# Patient Record
Sex: FEMALE | URBAN - METROPOLITAN AREA
[De-identification: names, ages, dates, MRNs, and addresses within clinical notes are randomized per-mention and may not be internally consistent; named-entity substitution may affect disease eponyms.]

---

## 2022-02-24 ENCOUNTER — WEB ENCOUNTER (OUTPATIENT)
Dept: URBAN - METROPOLITAN AREA CLINIC 113 | Facility: CLINIC | Age: 32
End: 2022-02-24

## 2022-04-13 ENCOUNTER — OFFICE VISIT (OUTPATIENT)
Dept: URBAN - METROPOLITAN AREA CLINIC 107 | Facility: CLINIC | Age: 32
End: 2022-04-13
Payer: COMMERCIAL

## 2022-04-13 VITALS
HEART RATE: 94 BPM | TEMPERATURE: 98 F | WEIGHT: 161 LBS | DIASTOLIC BLOOD PRESSURE: 95 MMHG | HEIGHT: 62 IN | BODY MASS INDEX: 29.63 KG/M2 | SYSTOLIC BLOOD PRESSURE: 120 MMHG | RESPIRATION RATE: 18 BRPM

## 2022-04-13 DIAGNOSIS — R10.84 GENERALIZED ABDOMINAL PAIN: ICD-10-CM

## 2022-04-13 DIAGNOSIS — R14.0 BLOATING: ICD-10-CM

## 2022-04-13 DIAGNOSIS — R19.7 DIARRHEA, UNSPECIFIED TYPE: ICD-10-CM

## 2022-04-13 PROCEDURE — 99204 OFFICE O/P NEW MOD 45 MIN: CPT | Performed by: INTERNAL MEDICINE

## 2022-04-13 PROCEDURE — 99244 OFF/OP CNSLTJ NEW/EST MOD 40: CPT | Performed by: INTERNAL MEDICINE

## 2022-04-13 RX ORDER — BUSPIRONE HYDROCHLORIDE 15 MG/1
1 TABLET TABLET ORAL TWICE A DAY
Status: ACTIVE | COMMUNITY

## 2022-04-13 RX ORDER — PANTOPRAZOLE SODIUM 40 MG/1
1 TABLET TABLET, DELAYED RELEASE ORAL BID
Qty: 180 TABLET | Refills: 3 | OUTPATIENT
Start: 2022-04-13

## 2022-04-13 NOTE — HPI-TODAY'S VISIT:
The patient was referred by Dr. Dayron Canas for abdominal pain/bloating/diarrhea.   A copy of this document is being forwarded to the referring provider.  31-year-old female presenting with a primary complaint of abdominal pain, bloating, gas, and diarrhea.  She has had labs performed in March 2021 by her primary care physician.  This included a CBC which was normal, a CMP which was normal, and thyroid testing which was normal. Her symptoms have been ongoing for "a while". She had her GB removed in Dec 2015 but her symtpoms continued. She gets flare ups and never feels 100%. She went to the ER and was dx with gastritis in Dec 2019. She has never had an EGD or colonoscopy.   Her grandmother has colitis and some cousins have crohn's disease. She has daily diarrhea up to 4-5 times per day. She denies any rectal bleeding. She has never been tested for celiac disease. She avoids dairy since it increases her symptoms. She does take Meloxicam PRN but has not taken it for " a while". She may take it once/twice per month. She has not really been treated for GERD.

## 2022-05-05 ENCOUNTER — WEB ENCOUNTER (OUTPATIENT)
Dept: URBAN - METROPOLITAN AREA CLINIC 107 | Facility: CLINIC | Age: 32
End: 2022-05-05

## 2022-07-01 ENCOUNTER — OFFICE VISIT (OUTPATIENT)
Dept: URBAN - METROPOLITAN AREA SURGERY CENTER 25 | Facility: SURGERY CENTER | Age: 32
End: 2022-07-01

## 2022-07-12 ENCOUNTER — OFFICE VISIT (OUTPATIENT)
Dept: URBAN - METROPOLITAN AREA CLINIC 113 | Facility: CLINIC | Age: 32
End: 2022-07-12

## 2022-07-14 ENCOUNTER — OFFICE VISIT (OUTPATIENT)
Dept: URBAN - METROPOLITAN AREA MEDICAL CENTER 19 | Facility: MEDICAL CENTER | Age: 32
End: 2022-07-14
Payer: COMMERCIAL

## 2022-07-14 DIAGNOSIS — R10.13 ABDOMINAL DISCOMFORT, EPIGASTRIC: ICD-10-CM

## 2022-07-14 DIAGNOSIS — K29.80 ACUTE DUODENITIS: ICD-10-CM

## 2022-07-14 DIAGNOSIS — R19.7 ACUTE DIARRHEA: ICD-10-CM

## 2022-07-14 DIAGNOSIS — K29.60 ADENOPAPILLOMATOSIS GASTRICA: ICD-10-CM

## 2022-07-14 PROCEDURE — 43239 EGD BIOPSY SINGLE/MULTIPLE: CPT | Performed by: INTERNAL MEDICINE

## 2022-07-14 RX ORDER — PANTOPRAZOLE SODIUM 40 MG/1
1 TABLET TABLET, DELAYED RELEASE ORAL BID
Qty: 180 TABLET | Refills: 3 | Status: ACTIVE | COMMUNITY
Start: 2022-04-13

## 2022-07-14 RX ORDER — BUSPIRONE HYDROCHLORIDE 15 MG/1
1 TABLET TABLET ORAL TWICE A DAY
Status: ACTIVE | COMMUNITY

## 2022-07-25 ENCOUNTER — OFFICE VISIT (OUTPATIENT)
Dept: URBAN - METROPOLITAN AREA CLINIC 113 | Facility: CLINIC | Age: 32
End: 2022-07-25

## 2022-07-27 ENCOUNTER — WEB ENCOUNTER (OUTPATIENT)
Dept: URBAN - METROPOLITAN AREA CLINIC 107 | Facility: CLINIC | Age: 32
End: 2022-07-27

## 2022-07-28 ENCOUNTER — OFFICE VISIT (OUTPATIENT)
Dept: URBAN - METROPOLITAN AREA CLINIC 107 | Facility: CLINIC | Age: 32
End: 2022-07-28

## 2022-07-29 ENCOUNTER — OFFICE VISIT (OUTPATIENT)
Dept: URBAN - METROPOLITAN AREA CLINIC 107 | Facility: CLINIC | Age: 32
End: 2022-07-29
Payer: COMMERCIAL

## 2022-07-29 ENCOUNTER — DASHBOARD ENCOUNTERS (OUTPATIENT)
Age: 32
End: 2022-07-29

## 2022-07-29 VITALS
DIASTOLIC BLOOD PRESSURE: 84 MMHG | HEIGHT: 62 IN | RESPIRATION RATE: 18 BRPM | HEART RATE: 88 BPM | BODY MASS INDEX: 30.55 KG/M2 | WEIGHT: 166 LBS | SYSTOLIC BLOOD PRESSURE: 113 MMHG | TEMPERATURE: 97.8 F

## 2022-07-29 DIAGNOSIS — K58.0 IRRITABLE BOWEL SYNDROME WITH DIARRHEA: ICD-10-CM

## 2022-07-29 DIAGNOSIS — R11.0 NAUSEA: ICD-10-CM

## 2022-07-29 DIAGNOSIS — R14.0 ABDOMINAL BLOATING: ICD-10-CM

## 2022-07-29 DIAGNOSIS — R10.84 GENERALIZED ABDOMINAL PAIN: ICD-10-CM

## 2022-07-29 PROBLEM — 197125005: Status: ACTIVE | Noted: 2022-07-29

## 2022-07-29 PROCEDURE — 99214 OFFICE O/P EST MOD 30 MIN: CPT

## 2022-07-29 RX ORDER — ONDANSETRON 4 MG/1
1 TABLET ON THE TONGUE AND ALLOW TO DISSOLVE TABLET, ORALLY DISINTEGRATING ORAL
Qty: 40 | Refills: 0 | OUTPATIENT
Start: 2022-07-29

## 2022-07-29 RX ORDER — DICYCLOMINE HYDROCHLORIDE 10 MG/1
1 CAPSULE CAPSULE ORAL
Qty: 90 | Refills: 1 | OUTPATIENT
Start: 2022-07-29 | End: 2022-09-27

## 2022-07-29 RX ORDER — SERTRALINE HYDROCHLORIDE 50 MG/1
1 TABLET TABLET, FILM COATED ORAL ONCE A DAY
Status: ACTIVE | COMMUNITY

## 2022-07-29 RX ORDER — RIFAXIMIN 550 MG/1
1 TABLET TABLET ORAL THREE TIMES A DAY
Qty: 42 TABLET | Refills: 0 | OUTPATIENT
Start: 2022-07-29 | End: 2022-08-12

## 2022-07-29 RX ORDER — BUSPIRONE HYDROCHLORIDE 15 MG/1
1 TABLET TABLET ORAL TWICE A DAY
Status: DISCONTINUED | COMMUNITY

## 2022-07-29 RX ORDER — PANTOPRAZOLE SODIUM 40 MG/1
1 TABLET TABLET, DELAYED RELEASE ORAL BID
Qty: 180 TABLET | Refills: 3 | Status: ON HOLD | COMMUNITY
Start: 2022-04-13

## 2022-07-29 NOTE — HPI-TODAY'S VISIT:
31-year-old female presents for follow-up after EGD.  She was last seen on 4/13/2022 as a referral for abdominal pain, bloating and diarrhea.  She has had intermittent symptoms that seem to resemble possible GERD.  She also takes NSAIDs on a regular basis.  She was recommended to stop meloxicam and start pantoprazole 40 mg twice daily.  She was planned for celiac testing as well. Patient reported worsening symptoms in May therefore an EGD was scheduled.  EGD 7/14/2022:Performed without difficulty.  Normal esophagus.  Diffuse minimal inflammation characterized by erythema was found in the gastric body and antrum and biopsied.  Pathology revealed mild chronic gastritis without H. pylori.  Normal duodenum.  This was also biopsied and revealed normal villous and crypt architecture with no significant increase in intraepithelial lymphocytes, no dysplasia or malignancy.   She does have diarrhea predominant bowel habits after getting her gallbladder taken out. SHe does have postprandial bloating though this can also happen without triggers. She does have intermittent nausea. She stopped the pantoprazole as it worsened her nausea. She continues to have abdominal cramping as well. She denies blood per rectum or unintentional weight loss.   4/13/2022: The patient was referred by Dr. Dayron Canas for abdominal pain/bloating/diarrhea.   A copy of this document is being forwarded to the referring provider.  31-year-old female presenting with a primary complaint of abdominal pain, bloating, gas, and diarrhea.  She has had labs performed in March 2021 by her primary care physician.  This included a CBC which was normal, a CMP which was normal, and thyroid testing which was normal. Her symptoms have been ongoing for "a while". She had her GB removed in Dec 2015 but her symptoms continued. She gets flare-ups and never feels 100%. She went to the ER and was dx with gastritis in Dec 2019. She has never had an EGD or colonoscopy.   Her grandmother has colitis and some cousins have Crohn's disease. She has daily diarrhea up to 4-5 times per day. She denies any rectal bleeding. She has never been tested for celiac disease. She avoids dairy since it increases her symptoms. She does take Meloxicam PRN but has not taken it for " a while". She may take it once/twice per month. She has not really been treated for GERD.

## 2022-08-01 ENCOUNTER — TELEPHONE ENCOUNTER (OUTPATIENT)
Dept: URBAN - METROPOLITAN AREA CLINIC 107 | Facility: CLINIC | Age: 32
End: 2022-08-01

## 2022-08-01 RX ORDER — RIFAXIMIN 550 MG/1
1 TABLET TABLET ORAL THREE TIMES A DAY
Qty: 60 TABLET | Refills: 0
Start: 2022-07-29 | End: 2022-08-21

## 2022-10-14 ENCOUNTER — OFFICE VISIT (OUTPATIENT)
Dept: URBAN - METROPOLITAN AREA CLINIC 107 | Facility: CLINIC | Age: 32
End: 2022-10-14

## 2022-10-14 NOTE — HPI-TODAY'S VISIT:
32-year-old female presents for follow-up.  She was last seen on 7/29/2022.  She does have multiple GI complaints including intermittent nausea, abdominal bloating, abdominal cramping and diarrhea predominant bowel habits.  EGD was negative for celiac disease and largely unremarkable.  PPI worsened her symptoms.  Question diarrhea predominant IBS versus SIBO versus functional bowel.  She was treated empirically with Xifaxan and started on daily fiber.  If no improvement we would consider bile acid binder and daily probiotic.  If still no improvement we would consider colonoscopy.  She was provided Zofran and dicyclomine for symptomatic relief as well.   7/29/2022: 31-year-old female presents for follow-up after EGD.  She was last seen on 4/13/2022 as a referral for abdominal pain, bloating and diarrhea.  She has had intermittent symptoms that seem to resemble possible GERD.  She also takes NSAIDs on a regular basis.  She was recommended to stop meloxicam and start pantoprazole 40 mg twice daily.  She was planned for celiac testing as well. Patient reported worsening symptoms in May therefore an EGD was scheduled.  She does have diarrhea predominant bowel habits after getting her gallbladder taken out. SHe does have postprandial bloating though this can also happen without triggers. She does have intermittent nausea. She stopped the pantoprazole as it worsened her nausea. She continues to have abdominal cramping as well. She denies blood per rectum or unintentional weight loss.   4/13/2022: The patient was referred by Dr. Dayron Canas for abdominal pain/bloating/diarrhea.   A copy of this document is being forwarded to the referring provider.  31-year-old female presenting with a primary complaint of abdominal pain, bloating, gas, and diarrhea.  She has had labs performed in March 2021 by her primary care physician.  This included a CBC which was normal, a CMP which was normal, and thyroid testing which was normal. Her symptoms have been ongoing for "a while". She had her GB removed in Dec 2015 but her symptoms continued. She gets flare-ups and never feels 100%. She went to the ER and was dx with gastritis in Dec 2019. She has never had an EGD or colonoscopy.   Her grandmother has colitis and some cousins have Crohn's disease. She has daily diarrhea up to 4-5 times per day. She denies any rectal bleeding. She has never been tested for celiac disease. She avoids dairy since it increases her symptoms. She does take Meloxicam PRN but has not taken it for " a while". She may take it once/twice per month. She has not really been treated for GERD.

## 2022-10-14 NOTE — HPI-OTHER HISTORIES
EGD 7/14/2022:Performed without difficulty.  Normal esophagus.  Diffuse minimal inflammation characterized by erythema was found in the gastric body and antrum and biopsied.  Pathology revealed mild chronic gastritis without H. pylori.  Normal duodenum.  This was also biopsied and revealed normal villous and crypt architecture with no significant increase in intraepithelial lymphocytes, no dysplasia or malignancy.